# Patient Record
Sex: MALE | Race: WHITE | NOT HISPANIC OR LATINO | URBAN - METROPOLITAN AREA
[De-identification: names, ages, dates, MRNs, and addresses within clinical notes are randomized per-mention and may not be internally consistent; named-entity substitution may affect disease eponyms.]

---

## 2019-10-14 ENCOUNTER — IMPORTED ENCOUNTER (OUTPATIENT)
Dept: URBAN - METROPOLITAN AREA CLINIC 50 | Facility: CLINIC | Age: 49
End: 2019-10-14

## 2019-11-01 NOTE — PATIENT DISCUSSION
Hx of surgery as a child. May want to have updated consult with Dr Veronica Rosales or Jessica Trimble to have further correction.

## 2021-04-18 ASSESSMENT — TONOMETRY
OD_IOP_MMHG: 15
OS_IOP_MMHG: 16

## 2021-04-18 ASSESSMENT — VISUAL ACUITY
OD_SC: 20/200
OD_CC: J1+@ 17 IN
OS_SC: 20/200
OS_CC: J1+@ 17 IN
OD_PH: @ 17 IN
OS_PH: @ 17 IN

## 2022-04-14 NOTE — PATIENT DISCUSSION
Hx of surgery as a child. May want to have updated consult with Dr Devika Horton or Anton Tran to have further correction.

## 2022-04-14 NOTE — PROCEDURE NOTE: CLINICAL
PROCEDURE NOTE: Removal of Conjunctival FB, Embedded #3 Left Upper Lid. Anesthesia: Topical. Prior to treatment, the risks/benefits/alternatives were discussed. The patient wished to proceed with procedure. Embedded conjunctival FB removed with forcep/needle. Globe and conjunctiva intact. Patient tolerated procedure well. There were no complications. Post procedure instructions given. Edna Hickman

## 2022-08-12 ENCOUNTER — PREPPED CHART (OUTPATIENT)
Dept: URBAN - METROPOLITAN AREA CLINIC 48 | Facility: LOCATION | Age: 52
End: 2022-08-12

## 2022-08-23 ENCOUNTER — NEW PATIENT (OUTPATIENT)
Dept: URBAN - METROPOLITAN AREA CLINIC 48 | Facility: LOCATION | Age: 52
End: 2022-08-23

## 2022-08-23 DIAGNOSIS — H40.013: ICD-10-CM

## 2022-08-23 DIAGNOSIS — H52.13: ICD-10-CM

## 2022-08-23 DIAGNOSIS — Z01.01: ICD-10-CM

## 2022-08-23 DIAGNOSIS — L72.3: ICD-10-CM

## 2022-08-23 PROCEDURE — 92015 DETERMINE REFRACTIVE STATE: CPT

## 2022-08-23 PROCEDURE — 92004 COMPRE OPH EXAM NEW PT 1/>: CPT

## 2022-08-23 ASSESSMENT — VISUAL ACUITY
OS_SC: 20/100
OD_CC: 20/30
OS_CC: 20/30
OU_SC: J2 @16IN
OD_SC: 20/100

## 2022-08-23 ASSESSMENT — KERATOMETRY
OD_K1POWER_DIOPTERS: 43.75
OS_K1POWER_DIOPTERS: 43.75
OS_AXISANGLE_DEGREES: 80
OS_K2POWER_DIOPTERS: 45.00
OD_AXISANGLE_DEGREES: 90
OD_K2POWER_DIOPTERS: 45.50
OD_AXISANGLE2_DEGREES: 180
OS_AXISANGLE2_DEGREES: 170

## 2022-08-23 ASSESSMENT — TONOMETRY
OD_IOP_MMHG: 15
OS_IOP_MMHG: 16

## 2022-08-23 NOTE — PATIENT DISCUSSION
Per moderate c/d ratio OU. IOP 15/16. (-)Family hx of Glaucoma. Schedule next available HVF/OCT(RNFL).

## 2022-08-23 NOTE — PATIENT DISCUSSION
H/o excision in the past with ST. Patient would like to proceed with excision, schedule next available consult for excision with Dr. Janessa Crain.

## 2022-10-06 NOTE — PATIENT DISCUSSION
Hx of surgery as a child. May want to have updated consult with Dr Keyanna Chadwick or Lulú Stanford to have further correction.

## 2023-10-26 ENCOUNTER — COMPREHENSIVE EXAM (OUTPATIENT)
Dept: URBAN - METROPOLITAN AREA CLINIC 48 | Facility: LOCATION | Age: 53
End: 2023-10-26

## 2023-10-26 DIAGNOSIS — G43.B0: ICD-10-CM

## 2023-10-26 DIAGNOSIS — H40.013: ICD-10-CM

## 2023-10-26 DIAGNOSIS — L72.3: ICD-10-CM

## 2023-10-26 DIAGNOSIS — H52.13: ICD-10-CM

## 2023-10-26 DIAGNOSIS — Z01.01: ICD-10-CM

## 2023-10-26 PROCEDURE — 92014 COMPRE OPH EXAM EST PT 1/>: CPT

## 2023-10-26 PROCEDURE — 92015 DETERMINE REFRACTIVE STATE: CPT

## 2023-10-26 PROCEDURE — 92133 CPTRZD OPH DX IMG PST SGM ON: CPT

## 2023-10-26 ASSESSMENT — KERATOMETRY
OD_AXISANGLE2_DEGREES: 180
OD_K2POWER_DIOPTERS: 45.50
OD_K1POWER_DIOPTERS: 43.75
OD_AXISANGLE_DEGREES: 90
OS_AXISANGLE2_DEGREES: 170
OS_K1POWER_DIOPTERS: 43.75
OS_K2POWER_DIOPTERS: 45.00
OS_AXISANGLE_DEGREES: 80

## 2023-10-26 ASSESSMENT — VISUAL ACUITY
OD_CC: 20/20-1
OU_CC: J1+ @16IN
OD_GLARE: 20/20
OS_CC: 20/20-1
OS_GLARE: 20/20-1

## 2023-10-26 ASSESSMENT — TONOMETRY
OS_IOP_MMHG: 14
OD_IOP_MMHG: 15